# Patient Record
Sex: MALE
[De-identification: names, ages, dates, MRNs, and addresses within clinical notes are randomized per-mention and may not be internally consistent; named-entity substitution may affect disease eponyms.]

---

## 2018-02-28 ENCOUNTER — HOSPITAL ENCOUNTER (EMERGENCY)
Dept: HOSPITAL 92 - ERS | Age: 38
Discharge: LEFT BEFORE DISCHARGE | End: 2018-02-28
Payer: SELF-PAY

## 2018-02-28 DIAGNOSIS — Z53.21: Primary | ICD-10-CM

## 2018-04-27 ENCOUNTER — HOSPITAL ENCOUNTER (EMERGENCY)
Dept: HOSPITAL 92 - ERS | Age: 38
Discharge: HOME | End: 2018-04-27
Payer: SELF-PAY

## 2018-04-27 DIAGNOSIS — F17.210: ICD-10-CM

## 2018-04-27 DIAGNOSIS — K02.9: Primary | ICD-10-CM

## 2018-04-27 PROCEDURE — 96372 THER/PROPH/DIAG INJ SC/IM: CPT

## 2019-07-18 ENCOUNTER — HOSPITAL ENCOUNTER (EMERGENCY)
Dept: HOSPITAL 92 - ERS | Age: 39
Discharge: HOME | End: 2019-07-18
Payer: SELF-PAY

## 2019-07-18 DIAGNOSIS — G43.909: Primary | ICD-10-CM

## 2019-07-18 DIAGNOSIS — F17.210: ICD-10-CM

## 2019-07-18 LAB
ALBUMIN SERPL BCG-MCNC: 4.2 G/DL (ref 3.5–5)
ALP SERPL-CCNC: 69 U/L (ref 40–150)
ALT SERPL W P-5'-P-CCNC: 23 U/L (ref 8–55)
ANION GAP SERPL CALC-SCNC: 11 MMOL/L (ref 10–20)
AST SERPL-CCNC: 16 U/L (ref 5–34)
BASOPHILS # BLD AUTO: 0.1 THOU/UL (ref 0–0.2)
BASOPHILS NFR BLD AUTO: 1.2 % (ref 0–1)
BILIRUB SERPL-MCNC: 0.4 MG/DL (ref 0.2–1.2)
BUN SERPL-MCNC: 9 MG/DL (ref 8.9–20.6)
CALCIUM SERPL-MCNC: 9 MG/DL (ref 7.8–10.44)
CHLORIDE SERPL-SCNC: 106 MMOL/L (ref 98–107)
CO2 SERPL-SCNC: 25 MMOL/L (ref 22–29)
CREAT CL PREDICTED SERPL C-G-VRATE: 0 ML/MIN (ref 70–130)
EOSINOPHIL # BLD AUTO: 0.3 THOU/UL (ref 0–0.7)
EOSINOPHIL NFR BLD AUTO: 4.5 % (ref 0–10)
GLOBULIN SER CALC-MCNC: 2.5 G/DL (ref 2.4–3.5)
GLUCOSE SERPL-MCNC: 108 MG/DL (ref 70–105)
HGB BLD-MCNC: 13.5 G/DL (ref 14–18)
LYMPHOCYTES # BLD: 2 THOU/UL (ref 1.2–3.4)
LYMPHOCYTES NFR BLD AUTO: 27 % (ref 21–51)
MCH RBC QN AUTO: 30.2 PG (ref 27–31)
MCV RBC AUTO: 90 FL (ref 78–98)
MONOCYTES # BLD AUTO: 0.5 THOU/UL (ref 0.11–0.59)
MONOCYTES NFR BLD AUTO: 7.2 % (ref 0–10)
NEUTROPHILS # BLD AUTO: 4.5 THOU/UL (ref 1.4–6.5)
NEUTROPHILS NFR BLD AUTO: 60 % (ref 42–75)
PLATELET # BLD AUTO: 222 THOU/UL (ref 130–400)
POTASSIUM SERPL-SCNC: 4 MMOL/L (ref 3.5–5.1)
RBC # BLD AUTO: 4.47 MILL/UL (ref 4.7–6.1)
SODIUM SERPL-SCNC: 138 MMOL/L (ref 136–145)
WBC # BLD AUTO: 7.4 THOU/UL (ref 4.8–10.8)

## 2019-07-18 PROCEDURE — 93005 ELECTROCARDIOGRAM TRACING: CPT

## 2019-07-18 PROCEDURE — 80053 COMPREHEN METABOLIC PANEL: CPT

## 2019-07-18 PROCEDURE — 70450 CT HEAD/BRAIN W/O DYE: CPT

## 2019-07-18 PROCEDURE — 84484 ASSAY OF TROPONIN QUANT: CPT

## 2019-07-18 PROCEDURE — 96375 TX/PRO/DX INJ NEW DRUG ADDON: CPT

## 2019-07-18 PROCEDURE — 85025 COMPLETE CBC W/AUTO DIFF WBC: CPT

## 2019-07-18 PROCEDURE — 96365 THER/PROPH/DIAG IV INF INIT: CPT

## 2019-07-18 NOTE — CT
CT OF THE BRAIN WITHOUT CONTRAST:

 

Date:  07/18/19 

 

COMPARISON:  

None. 

 

HISTORY:  

Right-sided headache and right jaw pain. 

 

TECHNIQUE:  

Multiple contiguous axial images were obtained in a CT of the brain without contrast. 

 

FINDINGS:

The brain is normal in morphology and attenuation without focal lesions or confluent areas of infarct
ion. There is no evidence of hydrocephalus, intracranial hemorrhage, or extra-axial fluid collection.
 

 

The calvarium and overlying soft tissues are unremarkable. The visualized paranasal sinuses and masto
id air cells are well aerated. 

 

IMPRESSION: 

No evidence of acute intracranial abnormality.  

 

POS: [facility]

## 2019-07-20 NOTE — EKG
Test Reason : 

Blood Pressure : ***/*** mmHG

Vent. Rate : 059 BPM     Atrial Rate : 059 BPM

   P-R Int : 162 ms          QRS Dur : 100 ms

    QT Int : 418 ms       P-R-T Axes : 060 002 007 degrees

   QTc Int : 413 ms

 

Sinus bradycardia

Otherwise normal ECG

 

Confirmed by SEBLE ISRAEL D.O. ([id number]),  CECILIO EASON ([id number]) on 7/20/2019 4:48:50 PM

 

Referred By:             Confirmed By:SEBLE ISRAEL D.O.

## 2019-07-21 ENCOUNTER — HOSPITAL ENCOUNTER (EMERGENCY)
Dept: HOSPITAL 92 - ERS | Age: 39
Discharge: HOME | End: 2019-07-21
Payer: SELF-PAY

## 2019-07-21 DIAGNOSIS — R68.84: ICD-10-CM

## 2019-07-21 DIAGNOSIS — F17.210: ICD-10-CM

## 2019-07-21 DIAGNOSIS — R51: Primary | ICD-10-CM

## 2019-07-21 PROCEDURE — 96375 TX/PRO/DX INJ NEW DRUG ADDON: CPT

## 2019-07-21 PROCEDURE — 96365 THER/PROPH/DIAG IV INF INIT: CPT

## 2019-07-21 PROCEDURE — 70491 CT SOFT TISSUE NECK W/DYE: CPT

## 2019-07-21 NOTE — CT
PRELIMINARY REPORT/[facility]/EMERGENCY AFTER HOURS PROCEDURE:

 

EXAM:

CT Neck With Contrast

 

EXAM DATE/TIME:

7/21/2019 2:34 AM

 

CLINICAL HISTORY:

39 years old, male; Patient HX: 40 y/o m presents to ED C/O x2 days of worsening throat pain. Associa
hira with HA, dysphagia. PT denies dyspnea, fever, otalgia, cough, nasal congestion

 

TECHNIQUE:

Imaging protocol: Axial computed tomography images of the neck with intravenous contrast. Coronal and
 sagittal reformatted images were created and reviewed.

 

COMPARISON:

No relevant prior studies available.

 

FINDINGS:

Nasopharynx: Normal.

Oropharynx: Normal. No significant tonsillar enlargement.

Hypopharynx: Normal.

Larynx: Normal. Normal epiglottis.

Retropharyngeal space: Normal.

Submandibular/Parotid glands: Normal. Glands are normal in size.

Thyroid: Normal. No enlarged or calcified nodules.

Lymph nodes: Normal. No lymphadenopathy.

Trachea: Visualized trachea is unremarkable.

Lungs: Normal as visualized.

Bones/joints: Normal. No acute fracture.

Soft tissues: Normal. No significant soft tissue swelling.

 

IMPRESSION:

No acute findings.

 

Thank you for allowing us to participate in the care of your patient.

Dictated and Authenticated by: Gunnar Edmond MD

07/21/2019 3:33 AM Central Time ([place] & [place])

 

 

 

FINAL REPORT 

 

EMERGENT AFTER HOURS CT OF THE NECK WITH CONTRAST:

 

FINDINGS/IMPRESSION: 

I agree with the findings and impression given in the preliminary report per [facility] physician.  

 

1.  No evidence of acute abnormality of the neck.

 

2.  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 
muscle before coursing into the region of the parotid gland.  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

## 2020-12-14 ENCOUNTER — HOSPITAL ENCOUNTER (EMERGENCY)
Dept: HOSPITAL 92 - ERS | Age: 40
Discharge: HOME | End: 2020-12-14
Payer: SELF-PAY

## 2020-12-14 DIAGNOSIS — K04.7: Primary | ICD-10-CM

## 2020-12-14 DIAGNOSIS — Z87.891: ICD-10-CM

## 2020-12-14 PROCEDURE — 64400 NJX AA&/STRD TRIGEMINAL NRV: CPT

## 2021-04-28 ENCOUNTER — HOSPITAL ENCOUNTER (EMERGENCY)
Dept: HOSPITAL 92 - ERS | Age: 41
Discharge: HOME | End: 2021-04-28
Payer: SELF-PAY

## 2021-04-28 DIAGNOSIS — F17.210: ICD-10-CM

## 2021-04-28 DIAGNOSIS — K04.7: Primary | ICD-10-CM

## 2021-04-28 PROCEDURE — 99283 EMERGENCY DEPT VISIT LOW MDM: CPT

## 2021-04-28 PROCEDURE — 96372 THER/PROPH/DIAG INJ SC/IM: CPT

## 2023-11-05 ENCOUNTER — HOSPITAL ENCOUNTER (EMERGENCY)
Dept: HOSPITAL 92 - ERS | Age: 43
Discharge: HOME | End: 2023-11-05
Payer: SELF-PAY

## 2023-11-05 DIAGNOSIS — K04.7: Primary | ICD-10-CM

## 2023-11-05 DIAGNOSIS — F17.210: ICD-10-CM

## 2023-11-05 DIAGNOSIS — K08.89: ICD-10-CM

## 2023-11-05 PROCEDURE — 96372 THER/PROPH/DIAG INJ SC/IM: CPT

## 2023-11-05 PROCEDURE — 99283 EMERGENCY DEPT VISIT LOW MDM: CPT

## 2024-01-17 ENCOUNTER — HOSPITAL ENCOUNTER (EMERGENCY)
Dept: HOSPITAL 92 - ERS | Age: 44
LOS: 1 days | Discharge: HOME | End: 2024-01-18
Payer: MEDICAID

## 2024-01-17 DIAGNOSIS — F17.210: ICD-10-CM

## 2024-01-17 DIAGNOSIS — K04.7: Primary | ICD-10-CM

## 2024-01-17 LAB
BASOPHILS # BLD AUTO: 0.1 THOU/UL (ref 0–0.2)
BASOPHILS NFR BLD AUTO: 0.9 % (ref 0–1)
EOSINOPHIL # BLD AUTO: 0.6 THOU/UL (ref 0–0.7)
EOSINOPHIL NFR BLD AUTO: 5.5 % (ref 0–10)
HCT VFR BLD CALC: 42.5 % (ref 42–52)
HGB BLD-MCNC: 14.8 G/DL (ref 14–18)
LYMPHOCYTES NFR BLD AUTO: 29.2 % (ref 21–51)
MCH RBC QN AUTO: 31.3 PG (ref 27–31)
MCV RBC AUTO: 89.9 FL (ref 78–98)
MONOCYTES # BLD AUTO: 0.8 THOU/UL (ref 0.11–0.59)
MONOCYTES NFR BLD AUTO: 8.2 % (ref 0–10)
NEUTROPHILS # BLD AUTO: 5.5 THOU/UL (ref 1.4–6.5)
NEUTROPHILS NFR BLD AUTO: 55.7 % (ref 42–75)
PLATELET # BLD AUTO: 261 10X3/UL (ref 130–400)
RBC # BLD AUTO: 4.73 MILL/UL (ref 4.7–6.1)
WBC # BLD AUTO: 10 10X3/UL (ref 4.8–10.8)

## 2024-01-17 PROCEDURE — 36415 COLL VENOUS BLD VENIPUNCTURE: CPT

## 2024-01-17 PROCEDURE — 80048 BASIC METABOLIC PNL TOTAL CA: CPT

## 2024-01-17 PROCEDURE — 85025 COMPLETE CBC W/AUTO DIFF WBC: CPT

## 2024-01-17 PROCEDURE — 96374 THER/PROPH/DIAG INJ IV PUSH: CPT

## 2024-01-17 PROCEDURE — 70487 CT MAXILLOFACIAL W/DYE: CPT

## 2024-01-17 PROCEDURE — 83605 ASSAY OF LACTIC ACID: CPT

## 2024-01-18 ENCOUNTER — HOSPITAL ENCOUNTER (EMERGENCY)
Dept: HOSPITAL 92 - ERS | Age: 44
Discharge: HOME | End: 2024-01-18
Payer: MEDICAID

## 2024-01-18 DIAGNOSIS — F17.210: ICD-10-CM

## 2024-01-18 DIAGNOSIS — K04.7: Primary | ICD-10-CM

## 2024-01-18 LAB
ANION GAP SERPL CALC-SCNC: 11 MMOL/L (ref 10–20)
BUN SERPL-MCNC: 9 MG/DL (ref 8.9–20.6)
CALCIUM SERPL-MCNC: 8.7 MG/DL (ref 7.8–10.44)
CHLORIDE SERPL-SCNC: 101 MMOL/L (ref 98–107)
CO2 SERPL-SCNC: 30 MMOL/L (ref 22–29)
CREAT CL PREDICTED SERPL C-G-VRATE: 0 ML/MIN (ref 70–130)
GLUCOSE SERPL-MCNC: 117 MG/DL (ref 70–105)
POTASSIUM SERPL-SCNC: 3.7 MMOL/L (ref 3.5–5.1)
SODIUM SERPL-SCNC: 138 MMOL/L (ref 136–145)

## 2024-01-18 PROCEDURE — 96374 THER/PROPH/DIAG INJ IV PUSH: CPT

## 2024-01-18 PROCEDURE — 70487 CT MAXILLOFACIAL W/DYE: CPT

## 2024-01-18 PROCEDURE — 83605 ASSAY OF LACTIC ACID: CPT

## 2024-01-18 PROCEDURE — 99282 EMERGENCY DEPT VISIT SF MDM: CPT

## 2024-01-18 PROCEDURE — 36415 COLL VENOUS BLD VENIPUNCTURE: CPT

## 2024-01-18 PROCEDURE — 96372 THER/PROPH/DIAG INJ SC/IM: CPT

## 2024-01-18 PROCEDURE — 85025 COMPLETE CBC W/AUTO DIFF WBC: CPT

## 2024-01-18 PROCEDURE — 80048 BASIC METABOLIC PNL TOTAL CA: CPT

## 2024-02-24 ENCOUNTER — HOSPITAL ENCOUNTER (EMERGENCY)
Dept: HOSPITAL 92 - ERS | Age: 44
Discharge: HOME | End: 2024-02-24
Payer: MEDICAID

## 2024-02-24 DIAGNOSIS — F17.210: ICD-10-CM

## 2024-02-24 DIAGNOSIS — K08.89: Primary | ICD-10-CM

## 2024-02-24 PROCEDURE — 99282 EMERGENCY DEPT VISIT SF MDM: CPT

## 2024-02-24 PROCEDURE — 96372 THER/PROPH/DIAG INJ SC/IM: CPT
